# Patient Record
Sex: MALE
[De-identification: names, ages, dates, MRNs, and addresses within clinical notes are randomized per-mention and may not be internally consistent; named-entity substitution may affect disease eponyms.]

---

## 2019-01-11 ENCOUNTER — HOSPITAL ENCOUNTER (EMERGENCY)
Dept: HOSPITAL 14 - H.ER | Age: 24
Discharge: HOME | End: 2019-01-11
Payer: COMMERCIAL

## 2019-01-11 VITALS — SYSTOLIC BLOOD PRESSURE: 145 MMHG | HEART RATE: 75 BPM | DIASTOLIC BLOOD PRESSURE: 70 MMHG

## 2019-01-11 VITALS — TEMPERATURE: 98.1 F | OXYGEN SATURATION: 99 % | RESPIRATION RATE: 18 BRPM

## 2019-01-11 DIAGNOSIS — J34.0: ICD-10-CM

## 2019-01-11 DIAGNOSIS — J34.89: Primary | ICD-10-CM

## 2019-01-11 DIAGNOSIS — R22.0: ICD-10-CM

## 2019-01-11 PROCEDURE — 96372 THER/PROPH/DIAG INJ SC/IM: CPT

## 2019-01-11 PROCEDURE — 99283 EMERGENCY DEPT VISIT LOW MDM: CPT

## 2019-01-11 NOTE — ED PDOC
HPI: Skin/Bite Injury


Time Seen by Provider: 01/11/19 21:22


Chief Complaint (Nursing): ENT Problem


Chief Complaint (Provider): ENT Problem


History Per: Patient


History/Exam Limitations: no limitations


Onset/Duration Of Symptoms: Days (x3)


Additional Complaint(s): 


24 y/o male presents to the ED for evaluation of swelling to nose and upper lip 

for the past x3 days. Patient reports that today he started bleeding from the 

skin of his left nare and noted pus discharge. He states he was recently sick 

with a cold and secondary to blowing his nose a lot, he had dry cracked skin 

that he is presuming got infected. Patient reports localized pain. Denies fever 

and has no other medical complaints.





PMD: None





Past Medical History


Reviewed: Historical Data, Nursing Documentation, Vital Signs


Vital Signs: 





                                Last Vital Signs











Temp  98.1 F   01/11/19 21:00


 


Pulse  80   01/11/19 21:00


 


Resp  18   01/11/19 21:00


 


BP  161/76 H  01/11/19 21:00


 


Pulse Ox  99   01/11/19 21:00














- Medical History


PMH: No Chronic Diseases





- Surgical History


Other surgeries: Septoplasty, Arthroscopy left shoulder, Procedure for cranium 

as child





- Family History


Family History: States: Unknown Family Hx





- Social History


Current smoker - smoking cessation education provided: No


Alcohol: Social


Drugs: Denies





- Home Medications


Home Medications: 


                                Ambulatory Orders











 Medication  Instructions  Recorded


 


Acetaminophen [Acetaminophen 8 650 mg PO Q8 PRN #21 tablet.er 01/11/19





Hour]  


 


Amoxicillin/Clavulanate [Augmentin 1 tab PO BID #14 tab 01/11/19





875 MG-125 MG]  


 


RX: Bacitracin Ointment 1 applic TOP BID #1 tube 01/11/19





[Bacitracin]  


 


RX: Ibuprofen [Motrin Tab] 800 mg PO Q8 PRN #21 tab 01/11/19














- Allergies


Allergies/Adverse Reactions: 


                                    Allergies











Allergy/AdvReac Type Severity Reaction Status Date / Time


 


Cephalosporins Allergy  SWELLING Verified 01/11/19 21:03














Review of Systems


ROS Statement: Except As Marked, All Systems Reviewed And Found Negative


Constitutional: Negative for: Fever


ENT: Positive for: Nose Pain, Nose Discharge





Physical Exam





- Reviewed


Nursing Documentation Reviewed: Yes


Vital Signs Reviewed: Yes





- Physical Exam


Comments: 


GENERAL APPEARANCE: Patient is awake, alert, oriented x 3, in no distress.  

Resting comfortably. 


Skin: (-) cyanosis, warm and dry.


Nose: (+) edema to upper lip and philtrum; (+) edema to skin between nares. Left

inferior turbinate of nose (+) inflammation. (+)2mm skin abrasion to left nare 

with surrounding edema and (+) active pus and bloody drainage. (+) erythema to 

tip of nose; (+) erythema to left nare.


ENT: Pharynx: clear, uvula midline (-) erythema, (-) exudate; Airway patent: (-)

stridor. Mucus membranes moist.


Pulmonary: lungs clear to auscultation bilaterally, no rhonchi, no wheezing, no 

rales.


Cardiac: regular rate and rhythm


Neck: Supple, FROM





- ECG


O2 Sat by Pulse Oximetry: 99 (RA)


Pulse Ox Interpretation: Normal





Medical Decision Making


Medical Decision Making: 


Time: 21:20


Initial Impression: cellulitis with possible abscess of nose


Plan:


Augmentin PO


Toradol IM


Decadron IM


Re-evaluation








2250


Repeat BP: 145/70


On re-evaluation, patient reports improvement of symptoms. On exam, patient 

remains AAOx3, in no acute distress. Vitals stable. 


Lab/ Diagnostic results d/w the patient in great detail. Diagnosis of abscess 

and cellulitis of left nare d/w the patient. 


Based on history, exam and diagnostic results, plan will be for outpatient 

follow up with ENT. 


Patient instructed to follow-up with pmd / referral provided / the clinic  in 1-

2 days without fail. Advised to take medication as prescribed. Return to the 

emergency room at any time for any new or worsening symptoms. Patient states he 

fully agrees with and understands discharge instructions. States that he agrees 

with the plan and disposition. Verbalized and repeated discharge instructions 

and plan. I have given the patient opportunity to ask any additional questions.


 

--------------------------------------------------------------------------------


-----------------


Scribe Attestation:


Documented by Tom Robles acting as a scribe for Fadia Davis PA-C.








Provider Scribe Attestation:


All medical record entries made by the Scribe were at my direction and 

personally dictated by me. I have reviewed the chart and agree that the record 

accurately reflects my personal performance of the history, physical exam, 

medical decision making, and the department course for this patient. I have also

personally directed, reviewed, and agree with the discharge instructions and 

disposition.





Disposition





- Clinical Impression


Clinical Impression: 


 Nasal pain, Cellulitis of nose, Swelling, mass, or lump on face








- Patient ED Disposition


Is Patient to be Admitted: No


Counseled Patient/Family Regarding: Studies Performed, Diagnosis, Need For 

Followup, Rx Given





- Disposition


Referrals: 


Behin,Babak, MD [Staff Provider] - 


Disposition: Routine/Home


Disposition Time: 22:50


Condition: STABLE


Additional Instructions: 


The emergency medical care you received today was directed at your acute 

symptoms. If you were prescribed any medication, please fill it and take as 

directed. It may take several days for your symptoms to resolve. Return to the 

Emergency Department if your symptoms worsen, do not improve, or if you have any

other problems.





Please contact your doctor in 2 days for re-evaluation and follow up / or call 

one of the physicians/clinics you have been referred to that are listed on the 

Patient Visit Information form that is included in your discharge packet. Bring 

any paperwork you were given at discharge with you along with any medications 

you are taking to your follow up visit. Our treatment cannot replace ongoing 

medical care by a primary care provider (PCP) outside of the emergency 

department.


Prescriptions: 


Acetaminophen [Acetaminophen 8 Hour] 650 mg PO Q8 PRN #21 tablet.er


 PRN Reason: Pain, Moderate (4-7)


Amoxicillin/Clavulanate [Augmentin 875 MG-125 MG] 1 tab PO BID #14 tab


RX: Bacitracin Ointment [Bacitracin] 1 applic TOP BID #1 tube


RX: Ibuprofen [Motrin Tab] 800 mg PO Q8 PRN #21 tab


 PRN Reason: Pain, Moderate (4-7)


Instructions:  Boil, Cellulitis (Skin Infection), Adult (DC)


Forms:  Wave - Private Location App (English)


Print Language: ENGLISH





- POA


Present On Arrival: None